# Patient Record
Sex: MALE | URBAN - METROPOLITAN AREA
[De-identification: names, ages, dates, MRNs, and addresses within clinical notes are randomized per-mention and may not be internally consistent; named-entity substitution may affect disease eponyms.]

---

## 2018-07-07 ENCOUNTER — RECORDS - HEALTHEAST (OUTPATIENT)
Dept: LAB | Facility: CLINIC | Age: 68
End: 2018-07-07

## 2018-07-07 LAB
SHIGA TOXIN 1: NEGATIVE
SHIGA TOXIN 2: NEGATIVE

## 2018-07-09 LAB — BACTERIA SPEC CULT: NORMAL

## 2025-02-18 ENCOUNTER — TELEPHONE (OUTPATIENT)
Dept: CARDIOLOGY | Facility: CLINIC | Age: 75
End: 2025-02-18

## 2025-02-18 DIAGNOSIS — I42.9 SECONDARY CARDIOMYOPATHY (H): Primary | ICD-10-CM

## 2025-02-18 PROBLEM — I87.2 VENOUS STASIS DERMATITIS OF BOTH LOWER EXTREMITIES: Status: ACTIVE | Noted: 2024-11-05

## 2025-02-18 PROBLEM — I48.91 HYPERCOAGULABLE STATE DUE TO ATRIAL FIBRILLATION (H): Status: ACTIVE | Noted: 2023-03-09

## 2025-02-18 PROBLEM — R91.1 RIGHT MIDDLE LOBE PULMONARY NODULE: Status: ACTIVE | Noted: 2020-04-27

## 2025-02-18 PROBLEM — M16.11 PRIMARY OSTEOARTHRITIS OF RIGHT HIP: Status: ACTIVE | Noted: 2017-08-10

## 2025-02-18 PROBLEM — J45.30 MILD PERSISTENT ASTHMA WITHOUT COMPLICATION: Status: ACTIVE | Noted: 2021-07-19

## 2025-02-18 PROBLEM — Z90.5 SINGLE KIDNEY: Status: ACTIVE | Noted: 2023-11-18

## 2025-02-18 PROBLEM — E55.9 VITAMIN D INSUFFICIENCY: Status: ACTIVE | Noted: 2018-07-20

## 2025-02-18 PROBLEM — I27.20 SEVERE PULMONARY HYPERTENSION (H): Status: ACTIVE | Noted: 2024-11-05

## 2025-02-18 PROBLEM — D68.69 HYPERCOAGULABLE STATE DUE TO ATRIAL FIBRILLATION (H): Status: ACTIVE | Noted: 2023-03-09

## 2025-02-18 PROBLEM — R31.0 GROSS HEMATURIA: Status: ACTIVE | Noted: 2020-04-27

## 2025-02-18 RX ORDER — ACETAMINOPHEN 500 MG
1000 TABLET ORAL EVERY 8 HOURS PRN
COMMUNITY

## 2025-02-18 RX ORDER — CHLORAL HYDRATE 500 MG
1000 CAPSULE ORAL DAILY
COMMUNITY

## 2025-02-18 RX ORDER — WARFARIN SODIUM 5 MG/1
5 TABLET ORAL DAILY
COMMUNITY
Start: 2024-09-25

## 2025-02-18 RX ORDER — VITAMIN B COMPLEX
1 TABLET ORAL DAILY
COMMUNITY
Start: 2023-08-17

## 2025-02-18 RX ORDER — METOPROLOL SUCCINATE 50 MG/1
50 TABLET, EXTENDED RELEASE ORAL DAILY
COMMUNITY
Start: 2024-03-18

## 2025-02-18 RX ORDER — FUROSEMIDE 20 MG/1
1 TABLET ORAL DAILY
COMMUNITY
Start: 2024-11-27

## 2025-02-18 RX ORDER — FLUTICASONE PROPIONATE 50 MCG
1 SPRAY, SUSPENSION (ML) NASAL DAILY
COMMUNITY
Start: 2024-05-28

## 2025-02-18 RX ORDER — ATORVASTATIN CALCIUM 20 MG/1
1 TABLET, FILM COATED ORAL AT BEDTIME
COMMUNITY
Start: 2024-04-12

## 2025-02-18 RX ORDER — TAMSULOSIN HYDROCHLORIDE 0.4 MG/1
0.4 CAPSULE ORAL DAILY
COMMUNITY
Start: 2024-07-24

## 2025-02-18 RX ORDER — AMLODIPINE BESYLATE 10 MG/1
1 TABLET ORAL DAILY
COMMUNITY
Start: 2024-09-19

## 2025-02-18 RX ORDER — LOSARTAN POTASSIUM 25 MG/1
12.5 TABLET ORAL DAILY
COMMUNITY
Start: 2024-06-21

## 2025-02-18 NOTE — TELEPHONE ENCOUNTER
Patient was seen by Dr. Byrne at [facility] on 2/18/2025.    Plan:  Testing to be completed @ [facility]: Labs, PFT & VQ scan  Testing to be completed @ [facility]: PYP scan & RHC w/Exercise performed by Dr. Byrne (1/2 dose warfarin x2 days)    Additional Action needed:  Place orders for testing at the [facility] scheduling needs:   Schedule testing      Plan was reviewed with the patient at time of the visit.  Patient verbalized understanding, agreed with plan and denied any further questions. Tracy France RN on 2/18/2025 at 2:57 PM

## 2025-02-19 ENCOUNTER — TELEPHONE (OUTPATIENT)
Dept: CARDIOLOGY | Facility: CLINIC | Age: 75
End: 2025-02-19

## 2025-02-19 DIAGNOSIS — I27.20 PULMONARY HTN (H): Primary | ICD-10-CM

## 2025-02-19 RX ORDER — LIDOCAINE 40 MG/G
CREAM TOPICAL
OUTPATIENT
Start: 2025-02-19

## 2025-02-19 NOTE — TELEPHONE ENCOUNTER
Patient spouse called. Preferred PFT and VQ scan at [facility] in [place].     Local clinic: [facility] ([facility])    Called pulmonary function lab @ [facility]: [phone]. They can see the order and can schedule  Called radiology and they can see the order but do not normally complete there. They work with an outside company to complete at their location called [employer] (vq scan) [phone] fax: [phone]. Faxed order and they will call patient to schedule the testing next time they are available on site. Unsure of timing.      Magali Escobar RN on 2/19/2025 at 10:46 AM

## 2025-02-24 ENCOUNTER — TELEPHONE (OUTPATIENT)
Dept: CARDIOLOGY | Facility: CLINIC | Age: 75
End: 2025-02-24

## 2025-02-24 NOTE — TELEPHONE ENCOUNTER
Called and LVM to schedule RHC michi Byrne and labs     Lakia Urbano  Clinic    Pulmonary Hypertension   [facility]  (p) [phone]

## 2025-02-24 NOTE — TELEPHONE ENCOUNTER
----- Message from Lakia JANE sent at 2/19/2025 10:36 AM CST -----  Regarding: RHC w TT  Patient was seen by Dr. Byrne at [facility] on 2/18/2025.     Plan:  Testing to be completed @ [facility]: Labs, PFT & VQ scan  Testing to be completed @ [facility]: PYP scan & RHC w/Exercise performed by Dr. Byrne (1/2 dose warfarin x2 days)

## 2025-03-03 ENCOUNTER — TELEPHONE (OUTPATIENT)
Dept: CARDIOLOGY | Facility: CLINIC | Age: 75
End: 2025-03-03

## 2025-03-03 NOTE — TELEPHONE ENCOUNTER
Cath Lab Case Request/Order    Location: [id number] [place] [address] Waiting Room    Procedure: Right Heart Cath (RHC) w/ exercise     Procedure Date: 4/25    Patient Arrival Time: 730    Procedure Time: 0830 (pending emergency)    Ordering Provider: Dr. Chavez Byrne    Performing Cardiologist: Dr. Chavez Byrne    Inpatient Bed Needed: No    Post-  Procedure TY appointment scheduled (1 - 2 weeks): N/A, RHC      Communicated Patient Instructions:     NPO, nothing to eat 8 hours and drink 2 hours before arrival time: No     , need to arrange a ride home - unable to drive post- procedure: N/A, RHC     Adult at home, need a responsible adult to stay with patient 24 hours post- procedure: N/A, RHC    Appointment was scheduled: Over the phone    Patient expressed understanding of above instructions and denied further questions at this time.    Lakia Urbano

## 2025-03-06 LAB — PULMONARY FUNCTION TEST: NORMAL

## 2025-03-24 ENCOUNTER — EXTERNAL ORDER RESULTS (OUTPATIENT)
Dept: CARDIOLOGY | Facility: CLINIC | Age: 75
End: 2025-03-24
Payer: COMMERCIAL

## 2025-04-16 ENCOUNTER — TELEPHONE (OUTPATIENT)
Dept: CARDIOLOGY | Facility: CLINIC | Age: 75
End: 2025-04-16
Payer: COMMERCIAL

## 2025-04-16 NOTE — TELEPHONE ENCOUNTER
Called patient to discuss pre-procedure instructions  Patient denied further questions, verbalized understanding of instructions. Will hold Tuesday ozempic administration next week      Pre-procedure instructions - Right heart catheterization      Your arrival time is 7:30AM 4/25.  Location is [id number] [place] [address] Waiting Room  . This is on the first floor down the diggs from the entrance. You can  park or there is a parking ramp near by.   You have a follow-up same day   Please plan on being at the hospital all day.  At any time, emergencies and/or urgent cases may come up which could delay the start of your procedure.    Anticoagulation Medication Instructions   warfarin (COUMADIN) - Hold 3 Days prior to procedure      Pre-procedure instructions - Right heart catheterization  No solid food for 8 hours prior  Nothing to drink 2 hours prior to arrival time  You can take your morning medications (except diabetic and blood thinners) with sips of water    Diabetic Medication Instructions    Diabetic Medication Instructions  Typical instructions for insulin diabetic medication holding are below. However, please reach out to your Primary Care Provider or Endocrinologist for specific instructions  DO NOT take any oral diabetic medication, short-acting diabetes medications/insulin, humalog or regular insulin the morning of your test  Take   dose of long-acting insulin (Lantus, Levemir) the day of your test  Remember to  bring your glucometer and insulin with you to take after your test if needed  DO NOT take injectable GLP-1 agonists semaglutide (Ozempic, Wegovy), dulaglutide (Trulicity), exenatide ER (Bydureon), tirzepatide (Mounjaro), or oral semaglutide (Rybelsus) for 7 days prior your procedure  Hold once daily injectable GLP-1 agonists exenatide (Byetta), liraglutide (Saxenda, Victoza) the day before and day of your procedurere

## 2025-04-25 ENCOUNTER — HOSPITAL ENCOUNTER (OUTPATIENT)
Dept: NUCLEAR MEDICINE | Facility: CLINIC | Age: 75
Setting detail: NUCLEAR MEDICINE
Discharge: HOME | End: 2025-04-25
Attending: INTERNAL MEDICINE
Payer: MEDICARE

## 2025-04-25 ENCOUNTER — APPOINTMENT (OUTPATIENT)
Dept: MEDSURG UNIT | Facility: CLINIC | Age: 75
End: 2025-04-25
Attending: INTERNAL MEDICINE
Payer: MEDICARE

## 2025-04-25 ENCOUNTER — HOSPITAL ENCOUNTER (OUTPATIENT)
Facility: CLINIC | Age: 75
Discharge: HOME | End: 2025-04-25
Attending: INTERNAL MEDICINE | Admitting: INTERNAL MEDICINE
Payer: MEDICARE

## 2025-04-25 ENCOUNTER — APPOINTMENT (OUTPATIENT)
Dept: LAB | Facility: CLINIC | Age: 75
End: 2025-04-25
Attending: INTERNAL MEDICINE
Payer: MEDICARE

## 2025-04-25 VITALS
SYSTOLIC BLOOD PRESSURE: 136 MMHG | OXYGEN SATURATION: 94 % | DIASTOLIC BLOOD PRESSURE: 98 MMHG | TEMPERATURE: 98.8 F | RESPIRATION RATE: 16 BRPM | WEIGHT: 264.6 LBS

## 2025-04-25 DIAGNOSIS — Z51.81 ENCOUNTER FOR MONITORING BRIDGING ANTICOAGULATION THERAPY: ICD-10-CM

## 2025-04-25 DIAGNOSIS — Z79.01 ENCOUNTER FOR MONITORING BRIDGING ANTICOAGULATION THERAPY: ICD-10-CM

## 2025-04-25 DIAGNOSIS — I48.91 A-FIB (H): ICD-10-CM

## 2025-04-25 DIAGNOSIS — I42.9 SECONDARY CARDIOMYOPATHY (H): ICD-10-CM

## 2025-04-25 DIAGNOSIS — I27.20 PULMONARY HTN (H): ICD-10-CM

## 2025-04-25 DIAGNOSIS — I48.21 PERMANENT ATRIAL FIBRILLATION (H): Primary | ICD-10-CM

## 2025-04-25 LAB
ANION GAP SERPL CALCULATED.3IONS-SCNC: 10 MMOL/L (ref 7–15)
BASOPHILS # BLD AUTO: 0 10E3/UL (ref 0–0.2)
BASOPHILS NFR BLD AUTO: 1 %
BUN SERPL-MCNC: 27.7 MG/DL (ref 8–23)
CALCIUM SERPL-MCNC: 9.4 MG/DL (ref 8.8–10.4)
CHLORIDE SERPL-SCNC: 107 MMOL/L (ref 98–107)
CREAT SERPL-MCNC: 1.4 MG/DL (ref 0.67–1.17)
EGFRCR SERPLBLD CKD-EPI 2021: 53 ML/MIN/1.73M2
EOSINOPHIL # BLD AUTO: 0.1 10E3/UL (ref 0–0.7)
EOSINOPHIL NFR BLD AUTO: 3 %
ERYTHROCYTE [DISTWIDTH] IN BLOOD BY AUTOMATED COUNT: 15.3 % (ref 10–15)
GLUCOSE SERPL-MCNC: 136 MG/DL (ref 70–99)
HCO3 SERPL-SCNC: 24 MMOL/L (ref 22–29)
HCT VFR BLD AUTO: 33.6 % (ref 40–53)
HGB BLD-MCNC: 10.8 G/DL (ref 13.3–17.7)
HGB BLD-MCNC: 11 G/DL (ref 13.3–17.7)
HGB BLD-MCNC: 11.1 G/DL (ref 13.3–17.7)
HGB BLD-MCNC: 11.5 G/DL (ref 13.3–17.7)
HGB BLD-MCNC: 11.9 G/DL (ref 13.3–17.7)
IMM GRANULOCYTES # BLD: 0 10E3/UL
IMM GRANULOCYTES NFR BLD: 1 %
INR PPP: 1.32 (ref 0.85–1.15)
LYMPHOCYTES # BLD AUTO: 1 10E3/UL (ref 0.8–5.3)
LYMPHOCYTES NFR BLD AUTO: 25 %
MCH RBC QN AUTO: 29.6 PG (ref 26.5–33)
MCHC RBC AUTO-ENTMCNC: 32.7 G/DL (ref 31.5–36.5)
MCV RBC AUTO: 91 FL (ref 78–100)
MONOCYTES # BLD AUTO: 0.5 10E3/UL (ref 0–1.3)
MONOCYTES NFR BLD AUTO: 12 %
NEUTROPHILS # BLD AUTO: 2.3 10E3/UL (ref 1.6–8.3)
NEUTROPHILS NFR BLD AUTO: 59 %
NRBC # BLD AUTO: 0 10E3/UL
NRBC BLD AUTO-RTO: 0 /100
NT-PROBNP SERPL-MCNC: 564 PG/ML (ref 0–900)
OXYHGB MFR BLDV: 22 % (ref 70–75)
OXYHGB MFR BLDV: 39 % (ref 70–75)
OXYHGB MFR BLDV: 55 % (ref 70–75)
OXYHGB MFR BLDV: 62 % (ref 70–75)
PLATELET # BLD AUTO: 180 10E3/UL (ref 150–450)
POTASSIUM SERPL-SCNC: 4.4 MMOL/L (ref 3.4–5.3)
RBC # BLD AUTO: 3.71 10E6/UL (ref 4.4–5.9)
SODIUM SERPL-SCNC: 141 MMOL/L (ref 135–145)
WBC # BLD AUTO: 3.9 10E3/UL (ref 4–11)

## 2025-04-25 PROCEDURE — C1894 INTRO/SHEATH, NON-LASER: HCPCS | Performed by: INTERNAL MEDICINE

## 2025-04-25 PROCEDURE — 93464 EXERCISE W/HEMODYNAMIC MEAS: CPT | Performed by: INTERNAL MEDICINE

## 2025-04-25 PROCEDURE — 343N000001 HC RX 343 MED OP 636: Performed by: INTERNAL MEDICINE

## 2025-04-25 PROCEDURE — 999N000142 HC STATISTIC PROCEDURE PREP ONLY

## 2025-04-25 PROCEDURE — 272N000001 HC OR GENERAL SUPPLY STERILE: Performed by: INTERNAL MEDICINE

## 2025-04-25 PROCEDURE — A9561 TC99M OXIDRONATE: HCPCS | Performed by: INTERNAL MEDICINE

## 2025-04-25 PROCEDURE — 93464 EXERCISE W/HEMODYNAMIC MEAS: CPT | Mod: 26 | Performed by: INTERNAL MEDICINE

## 2025-04-25 PROCEDURE — 85025 COMPLETE CBC W/AUTO DIFF WBC: CPT | Performed by: INTERNAL MEDICINE

## 2025-04-25 PROCEDURE — 78830 RP LOCLZJ TUM SPECT W/CT 1: CPT

## 2025-04-25 PROCEDURE — 250N000009 HC RX 250: Performed by: INTERNAL MEDICINE

## 2025-04-25 PROCEDURE — 80048 BASIC METABOLIC PNL TOTAL CA: CPT | Performed by: INTERNAL MEDICINE

## 2025-04-25 PROCEDURE — 82810 BLOOD GASES O2 SAT ONLY: CPT

## 2025-04-25 PROCEDURE — 93451 RIGHT HEART CATH: CPT | Mod: 26 | Performed by: INTERNAL MEDICINE

## 2025-04-25 PROCEDURE — 78830 RP LOCLZJ TUM SPECT W/CT 1: CPT | Mod: 26 | Performed by: RADIOLOGY

## 2025-04-25 PROCEDURE — 83880 ASSAY OF NATRIURETIC PEPTIDE: CPT | Performed by: INTERNAL MEDICINE

## 2025-04-25 PROCEDURE — C1751 CATH, INF, PER/CENT/MIDLINE: HCPCS | Performed by: INTERNAL MEDICINE

## 2025-04-25 PROCEDURE — 93451 RIGHT HEART CATH: CPT | Performed by: INTERNAL MEDICINE

## 2025-04-25 PROCEDURE — 36415 COLL VENOUS BLD VENIPUNCTURE: CPT | Performed by: INTERNAL MEDICINE

## 2025-04-25 PROCEDURE — 85018 HEMOGLOBIN: CPT

## 2025-04-25 PROCEDURE — 85610 PROTHROMBIN TIME: CPT | Performed by: INTERNAL MEDICINE

## 2025-04-25 PROCEDURE — 999N000132 HC STATISTIC PP CARE STAGE 1

## 2025-04-25 RX ORDER — LIDOCAINE 40 MG/G
CREAM TOPICAL
Status: COMPLETED | OUTPATIENT
Start: 2025-04-25 | End: 2025-04-25

## 2025-04-25 RX ORDER — ENOXAPARIN SODIUM 150 MG/ML
120 INJECTION SUBCUTANEOUS EVERY 12 HOURS
Qty: 5.6 ML | Refills: 0 | Status: SHIPPED | OUTPATIENT
Start: 2025-04-25 | End: 2025-04-25 | Stop reason: ALTCHOICE

## 2025-04-25 RX ADMIN — LIDOCAINE: 40 CREAM TOPICAL at 08:22

## 2025-04-25 RX ADMIN — Medication 16.3 MILLICURIE: at 11:33

## 2025-04-25 ASSESSMENT — ACTIVITIES OF DAILY LIVING (ADL)
ADLS_ACUITY_SCORE: 41

## 2025-04-25 NOTE — DISCHARGE INSTRUCTIONS
[facility]                        Interventional Cardiology  Discharge Instructions   Post Right Heart Cath and Exercise Stress Study    AFTER YOU GO HOME:  DO drink plenty of fluids  DO resume your regular diet and medications unless otherwise instructed by your Primary Physician  Do Not scrub the procedure site vigorously  No lotion or powder to the puncture site for 3 days    ACTIVITY:  Take it easy for the rest of the day. Do not do strenuous exercise and do not lift, pull, or push anything heavy. This lets the catheter site heal.     CARE OF THE CATHETER SITE:    For at least 24 hours, keep the bandage over the spot where the catheter was inserted.   Put ice or a cold pack on the area for 10 to 20 minutes at a time to help with soreness or swelling.   You may shower 24 hours after the procedure. Pat the incision dry.  Don't take a bath for 48 hours    WHEN SHOULD YOU CALL FOR HELP:  Monitor neck site for bleeding, swelling, or voice changes. If you notice bleeding or swelling immediately apply pressure to the site for 15 minutes, and call number below to speak with Cardiology Fellow.  If you experience any changes in your breathing you should call your doctor immediately or come to the closest Emergency Department.  Do not drive yourself.  If you have a fast-growing, painful lump at the catheter site.  If you have symptoms of infection, such as:  Increased pain, swelling, warmth, or redness.  Red streaks leading from the area.  Pus draining from the area.  A fever.    ADDITIONAL INSTRUCTIONS: Medications: You are to resume all home medications including anticoagulation therapy unless otherwise advised by your primary cardiologist or nurse coordinator.    Follow Up: Per your primary cardiology team    If you have any questions or concerns regarding your procedure site please call [phone] at any time & press option 4 to speak to the .  Ask for the Cardiology Fellow on call.  They  are available 24 hours a day.  You may also contact the Cardiology Clinic after hours number at [phone].                                                       Telephone Numbers [phone] Monday-Friday 8:00 am to 4:30 pm    [phone] After 4:30 pm Monday-Friday, Weekends & Holidays  Ask for Interventional Cardiologist on call. Someone is on call 24 hours/day   [facility] toll free number [phone] Monday-Friday 8:00 am to 4:30 pm   [facility] Emergency Dept [phone]

## 2025-04-25 NOTE — PROGRESS NOTES
Sharad returned from [facility]  RIJ primapore soft and dry  VSS  Discharge orders reviewed by RN to wife and patient  Will discharge to [facility] for next appointment

## 2025-04-25 NOTE — PROGRESS NOTES
PT on 2A prep for RHC and stress study. Vitally stable. Denies pain. Contrast allergy. Lidocaine applied to neck. Last took Warfarin on Monday. Wife bedside.

## 2025-04-25 NOTE — PRE-PROCEDURE
Consenting/Education for Cardiology Procedure: Right heart cath with exercise stress study and pulmonary dilator study     Patient understands we would like to perform the listed procedure(s) due to pHTN     The patient understands the following:      The procedure was described to the patient in detail.     None sedation is planned for this procedure. Patient understands risks and complications of the procedure which include but are not limited to bruising/swelling around the incision site, infection, bleeding, allergic reaction to local anesthetic, air embolism, arterial puncture, stroke, heart attack, need for emergency heart surgery, death.        Patient verbalized understanding of risks and benefits and has elected to proceed with the procedure or procedures listed above.    Daysi Davey DNP APRN CNP  ICU Cardiology-CICU Service  Securely message with the Vocera Web Console (learn more here)    AC: Coumadin held   VS:   /67 (BP Location: Right arm)   Temp 98.8  F (37.1  C) (Oral)   Resp 18   Wt 120 kg (264 lb 9.6 oz)   SpO2 99%   LABS:   Recent Labs   Lab 04/25/25  0733      POTASSIUM 4.4   CHLORIDE 107   CO2 24   BUN 27.7*   CR 1.40*     CrCl cannot be calculated (Unknown ideal weight.).                 Recent Labs   Lab 04/25/25  0733   INR 1.32*     Recent Labs   Lab 04/25/25  0733   HGB 11.0*   HCT 33.6*

## 2025-04-25 NOTE — PROGRESS NOTES
Service Date: April 25, 2025    Dear Dr. Montes,     We had the pleasure of seeing Mr. Ron Benavidez for follow-up in the renal [facility] pulmonary hypertension hemodynamic lab.  As you know he is a 74-year-old male with past medical history significant hypertension, hyperlipidemia, type 2 diabetes mellitus and atrial fibrillation was referred to us for further evaluation management of pulmonary hypertension.    We saw him in [place] in February.  We recommend him to complete a workup, for which he is returning today.    His PFT showed mild obstruction and reduced mildly reduced DLCO.  His VQ scan showed no evidence of chronic thromboembolic disease.  His serological workup for associated cause of pulmonary hypertension including rheumatoid factor, ALLY, HIV and hepatitis serology were negative.  He had a PYP scan today for which the results are pending.    His right heart catheterization showed combined pre and postcapillary pulmonary hypertension as described below.    PAST MEDICAL HISTORY:  1.  Hypertension   2. Hyperlipidemia   3. Type 2 diabetes mellitus  4.  Atrial fibrillation   5.  Blastomycosis   6.  Chronic kidney disease with a single kidney   7.  Renal carcinoma status post left nephrectomy    CURRENT MEDICATIONS:  No current facility-administered medications for this encounter.     Current Outpatient Medications   Medication Sig Dispense Refill    acetaminophen (TYLENOL) 500 MG tablet Take 1,000 mg by mouth every 8 hours as needed for pain.      amLODIPine (NORVASC) 10 MG tablet Take 1 tablet by mouth daily.      atorvastatin (LIPITOR) 20 MG tablet Take 1 tablet by mouth at bedtime.      fish oil-omega-3 fatty acids 1000 MG capsule Take 1,000 mg by mouth daily.      fluticasone (FLONASE) 50 MCG/ACT nasal spray Spray 1 spray into both nostrils daily.      losartan (COZAAR) 25 MG tablet Take 12.5 mg by mouth daily.      metoprolol succinate ER (TOPROL XL) 50 MG 24 hr tablet Take 50 mg by mouth  daily.      tamsulosin (FLOMAX) 0.4 MG capsule Take 0.4 mg by mouth daily.      vitamin D3 25 mcg (1000 units) tablet Take 1 tablet by mouth daily.      empagliflozin (JARDIANCE) 10 MG TABS tablet Take 1 tablet (10 mg) by mouth daily. 90 tablet 1    enoxaparin ANTICOAGULANT (LOVENOX) 80 MG/0.8ML syringe Inject 0.8 mLs (80 mg) subcutaneously 2 times daily. For lovenox bridging 7.2 mL 0    furosemide (LASIX) 20 MG tablet Take 1 tablet by mouth daily.      Semaglutide, 2 MG/DOSE, (OZEMPIC) 8 MG/3ML pen Inject 2 mg subcutaneously once a week.      warfarin ANTICOAGULANT (COUMADIN) 5 MG tablet Take 5 mg by mouth daily.           ROS:   10 point ROS negative except as discussed in above HPI.    EXAM:  BP (!) 136/98 (BP Location: Right arm)   Temp 98.8  F (37.1  C)   Resp 16   Wt 120 kg (264 lb 9.6 oz)   SpO2 94%   Patient was awake alert, oriented x3.  Patient was comfortable and in no apparent distress.  Had no pallor, cyanosis or jaundice.  Neck exam revealed no jugular venous distention.  Carotids were 2+ bilaterally.  Pulse was regular and rhythm.  Cardiac auscultation revealed normal S1 and normal S2.  No murmur, rub or gallop.  Auscultation of the lungs revealed normal vesicular breath sounds bilaterally.  Had no crepitations or wheezing.  Abdomen was soft with normal bowel sounds, no tenderness, no rigidity, or guarding.  No focal neurological deficit.  Lower extremities showed no edema.    Labs:  Recent Results (from the past 4 weeks)   INR    Collection Time: 04/25/25  7:33 AM   Result Value Ref Range    INR 1.32 (H) 0.85 - 1.15   Basic metabolic panel    Collection Time: 04/25/25  7:33 AM   Result Value Ref Range    Sodium 141 135 - 145 mmol/L    Potassium 4.4 3.4 - 5.3 mmol/L    Chloride 107 98 - 107 mmol/L    Carbon Dioxide (CO2) 24 22 - 29 mmol/L    Anion Gap 10 7 - 15 mmol/L    Urea Nitrogen 27.7 (H) 8.0 - 23.0 mg/dL    Creatinine 1.40 (H) 0.67 - 1.17 mg/dL    GFR Estimate 53 (L) >60 mL/min/1.73m2     Calcium 9.4 8.8 - 10.4 mg/dL    Glucose 136 (H) 70 - 99 mg/dL   NT probnp inpatient and ED    Collection Time: 04/25/25  7:33 AM   Result Value Ref Range    N terminal Pro BNP Inpatient 564 0 - 900 pg/mL   CBC with platelets and differential    Collection Time: 04/25/25  7:33 AM   Result Value Ref Range    WBC Count 3.9 (L) 4.0 - 11.0 10e3/uL    RBC Count 3.71 (L) 4.40 - 5.90 10e6/uL    Hemoglobin 11.0 (L) 13.3 - 17.7 g/dL    Hematocrit 33.6 (L) 40.0 - 53.0 %    MCV 91 78 - 100 fL    MCH 29.6 26.5 - 33.0 pg    MCHC 32.7 31.5 - 36.5 g/dL    RDW 15.3 (H) 10.0 - 15.0 %    Platelet Count 180 150 - 450 10e3/uL    % Neutrophils 59 %    % Lymphocytes 25 %    % Monocytes 12 %    % Eosinophils 3 %    % Basophils 1 %    % Immature Granulocytes 1 %    NRBCs per 100 WBC 0 <1 /100    Absolute Neutrophils 2.3 1.6 - 8.3 10e3/uL    Absolute Lymphocytes 1.0 0.8 - 5.3 10e3/uL    Absolute Monocytes 0.5 0.0 - 1.3 10e3/uL    Absolute Eosinophils 0.1 0.0 - 0.7 10e3/uL    Absolute Basophils 0.0 0.0 - 0.2 10e3/uL    Absolute Immature Granulocytes 0.0 <=0.4 10e3/uL    Absolute NRBCs 0.0 10e3/uL   Hemoglobin POCT    Collection Time: 04/25/25 10:09 AM   Result Value Ref Range    Hemoglobin POCT 10.8 (L) 13.3 - 17.7 g/dL   Oxyhemoglobin, venous POCT    Collection Time: 04/25/25 10:09 AM   Result Value Ref Range    Oxyhemoglobin Venous POCT 62 (L) 70 - 75 %   Hemoglobin POCT    Collection Time: 04/25/25 10:19 AM   Result Value Ref Range    Hemoglobin POCT 11.1 (L) 13.3 - 17.7 g/dL   Oxyhemoglobin, venous POCT    Collection Time: 04/25/25 10:19 AM   Result Value Ref Range    Oxyhemoglobin Venous POCT 55 (L) 70 - 75 %   Hemoglobin POCT    Collection Time: 04/25/25 10:26 AM   Result Value Ref Range    Hemoglobin POCT 11.5 (L) 13.3 - 17.7 g/dL   Oxyhemoglobin, venous POCT    Collection Time: 04/25/25 10:26 AM   Result Value Ref Range    Oxyhemoglobin Venous POCT 39 (L) 70 - 75 %   Hemoglobin POCT    Collection Time: 04/25/25 10:29 AM   Result Value  Ref Range    Hemoglobin POCT 11.9 (L) 13.3 - 17.7 g/dL   Oxyhemoglobin, venous POCT    Collection Time: 04/25/25 10:29 AM   Result Value Ref Range    Oxyhemoglobin Venous POCT 22 (L) 70 - 75 %         RHC:  Baseline supine:  RA: 12 mmHg  RV: 64/15 (RVEDP) mmHg  PA: 63/21 (36) mmHg  PCWP: 23 mmHg  Hg:10.8  PA Sat: 61.9%  Ao Sat: 98%    Phase 1 (Upright, baseline, pre-exercise):  RA: 4 mmHg  PA: 43/11 (22) mmHg  PCWP: 15 mmHg  PA Sat: 55%  Hal CO/CI: 6.1/2.5  PVR: 1.1 GERARDO  VO2: 401    Phase 2 (mid level exercise):  RA: 12 mmHg  PA: 62/21 (39) mmHg  PCWP: 27 mm  VO2: 892  Hal CO/CI: 10.4/4.3  PA Sat: 38.7%  Ao Sat: 97%    Delta mPAP: 17  Delta PCWP: 12  Delta CO:4.3  Delta mPAP/DeltaCO: 3.9 (>3)  Delta PCWP/DeltaCO: 2.8 (>2)    Phase 3 (peak exercise):  RA: 18 mmHg  PA: 69/28 (44) mmHg  PCWP: 30 mmHg  VO2: 1400  PA Sat: 22.1%  Ao Sat: 91%  Hal CO/ CI: 13.8/ 5.7  PVR: 1 GERARDO      Delta mPAP: 22 mmHg  Delta PCWP: 15 mmHg  Delta CO:7.7  Delta mPAP/DeltaCO: 2.8   Delta PCWP/DeltaCO: 1.95     Assessment and Plan:   In summary, Mr. Ron Benavidez is a very pleasant 74-year-old male with past medical history significant for hypertension, hyperlipidemia, type 2 diabetes mellitus, atrial fibrillation and single kidney with chronic kidney disease due to left nephrectomy for renal cell carcinoma who is referred to us for further evaluation management of pulmonary hypertension.    Impression  1.  Combined pre and postcapillary pulm hypertension  2.  Diastolic dysfunction/restrictive cardiomyopathy  3.  Atrial fibrillation with subtherapeutic INR due to interruption in Coumadin therapy    Plan  1.  Will do a PYP scan to rule out coexisting cardiac amyloidosis  2.  Will start him on empagliflozin 10 mg daily  3.  Low-salt diet and fluid restriction  4.  Regular moderate intensity exercise  5.  Repeat BMP in a month after starting empagliflozin  6.  Lovenox bridging with 80 mg subcu twice a day with a repeat INR on Monday, April 27  locally.  7.  Patient to resume Coumadin tonight  8.  Patient return to see us in a month after starting empagliflozin.  9.  Patient interested in participating in our phase 3 clinical trial evaluating the safety and efficacy of oral Levosimendan if he is not responding to empagliflozin.    It was a pleasure seeing Mr. Ron Madsen at the [facility] Pulmonary Hypertension [facility]. Please contact us with any questions or concerns that you may have.    Total time today was 50 minutes reviewing notes, imaging, labs, patient visit, orders and documentation.    Sincerely,    Chavez Byrne MD  Professor of Medicine  [facility]  Heart Failure, Transplant, and Mechanical Circulatory Support Cardiology   Cardiovascular Division  [facility]   [phone]

## 2025-05-01 ENCOUNTER — TELEPHONE (OUTPATIENT)
Dept: CARDIOLOGY | Facility: CLINIC | Age: 75
End: 2025-05-01
Payer: COMMERCIAL

## 2025-05-01 NOTE — TELEPHONE ENCOUNTER
Secure email sent to spouse with [employer] PAP application as cost was prohibitive for patient. She emailed back signed forms and we will fax on patient behalf     Faxed to [facility] at [phone]    Magali Escobar RN on 5/1/2025 at 3:39 PM

## (undated) DEVICE — KIT MICROINTRODUCER VASCULAR  4FRX21GAX4CM

## (undated) DEVICE — Device

## (undated) DEVICE — INTRO SHEATH 7FRX10CM PINNACLE RSS702

## (undated) DEVICE — INTRO SHTH 7FR 12CM DIL GW LL HUB FSTCTH .038IN

## (undated) DEVICE — PACK HEART RIGHT CUSTOM SAN32RHF18

## (undated) RX ORDER — LIDOCAINE 40 MG/G
CREAM TOPICAL
Status: DISPENSED
Start: 2025-04-25